# Patient Record
(demographics unavailable — no encounter records)

---

## 2019-02-05 NOTE — NUR
PATIENT C/O CHEST PAIN 8/10. PATIENT EDUCATED ON DIFFERENT TYPES OF PAIN
MEDICATION INCLUDING NITRO SL. PATIENT REFUSES NITRO SL AND STATES "IT DOESN'T
WORK". MORPHINE IVP CAN BE GIVEN AT THIS TIME. WILL MEDICATE AND CONTINUE TO
MONITOR.

## 2019-02-05 NOTE — NUR
PT BIB [facility] AND [facility] C/O 10/10 NON-RADIATING CHEST PAIN DESCRIBED AS
TIGHTNESS X2 DAYS. PT STS THAT HE HAS HAD CHEST PAIN "FOR A VERY LONG TIME",
BUT THAT IT HAS BEEN WORSE OVER THE PAST 2 DAYS. PT ALSO C/O OF BLEEDING FROM
HIS PENIS X1 DAY. PT STS "IT HAS BEEN IN MY URINE FOR AWHILE NOW, BUT NOW THE
BLOOD IS JUST COMING OUT." PER MEDIC, 1 NITRO SPRAY WAS GIVEN EN ROUTE W/ NO
RELIEF, 325 ASA, 4MG ZOFRAN WAS GIVEN EN ROUTE. BS ENROUTE . PT DENIES
BLURRED VISION, DENIES N/V, DENIES FEVER/CHILLS. PT C/O SOB. LUNG SOUNDS CLEAR
THROUGHOUT, RESPS E/U. PT PLACED ON CARDIAC MONITOR, CONTINUOUS PULSE OX, AND
2L O2 VIA NC. PT AWAKE AND ALERT, NAD NOTED AT THIS TIME

## 2019-02-05 NOTE — NUR
RECEIVED REPORT FROM AINSLEY HUMPHREY PRIOR TO PATIENT COMING TO ICU.
PATIENT CAME ON THE [facility].
VITAL SIGNS UPON ADMISSION  WHERE: 94% O2, 21 RESPIRATIONS, 68
HEART RATE, 107/57 BP MAP: 75. PATIENT IS ALERT AND ORIENTED. PATIENT IS
COOPERATIVE, PATIENT FOLLOWS COMMANDS. NEOSENEPRINE DRIP RUNNING AT
50MCG/HR. BED IS AT THE LOWEST POSITION, PATIENT IS CURRENTLY RESTING.

## 2019-02-05 NOTE — NUR
DR. VAZQUEZ AT BEDSIDE TO ASSESS PATIENT. UPDATES PROVIDED AND POC DISCUSSED.
PATIENT MAY EAT PER DR. VAZQUEZ. WILL CONTINUE TO MONITOR.

## 2019-02-05 NOTE — NUR
DR LEWIS ASSESSED PATIENT AT BEDSIDE. SPOKE WITH THE PATIENT ABOUT LOW BLOOD
PRESSURE AND EJECTION FRACTION. PATIENT UNDERSTOOD. NO FURTHER ORDERS AT THIS
TIME.

## 2019-02-05 NOTE — NUR
DOPAMINE WAS STOPPED, PT SWITCHED TO PHENYLEPHRINE PER MD ORDERS DUE TO PT'S
PRESSURE REMAINING LESS THAN SBP 90, AND FREQUENT PVCS SINCE DOPAMINE STARTED

## 2019-02-05 NOTE — NUR
REC'D REPORT FROM FLACO SCHMITZ TO ASSUME CARE. PT IS A/O X4, SPEECH CLEAR AND
APPROPRIATE. PERRLA NOTED. C/O 8/10 CHEST PAIN, MEDICATED WITH MORPHINE IVP,
WILL ON MONITOR FOR EFFECTIVENESS. CARDIAC MONITOR IN PLACE SHOWING NSR. VITAL
SIGNS WNL. ABD SOFT, NONTENDER TO TOUCH. BOWEL SOUNDS ACTIVE. VOIDS FREELY.
EMPTIED 500ML GISSELL COLORED URINE. IV TO LFA INTACT AND PATENT, FLUSHED WITH
10CC NS WITHOUT ANY INFILTRATIION NOTED. PT AMBULATORY. INSTRUCTED TO CALL FOR
ANY ASSISTANCE. CALL LIGHT WITHIN REACH. WILL CONTINUE TO MONITOR.

## 2019-02-05 NOTE — NUR
NIBP 112/68, MAP 81. PANCHITO-SYNEPHRINE DRIP TITRATED DOWN TO 25 MCG/MIN. WILL
CONTINUE TO MONITOR.

## 2019-02-06 NOTE — NUR
PATIENT RESTING COMFORTABLY IN BED AT THIS TIME. NO APPARENT DISTRESS OR
DISCOMFORT NOTED. IV PATENT AND INTACT. ALL QUESTIONS AND CONCERNS ADDRESSED.
SAFETY PRECAUTIONS MAINTAINED. ALL NEEDS ATTENDED TO. WILL ENDORSE ALL CARE TO
NIGHT SHIFT NURSE

## 2019-02-06 NOTE — NUR
RECEIVED PATIENT FROM ICU NURSE AT THIS TIME. PATIENT ABLE TO AMBULATE FROM
WHEELCHAIR TO BED. PATIENT RESTING COMFORTABLY IN BED. NO APPARENT DISTRESS OR
DISCOMFORT NOTED. PATIENT IS A/O X4 AND ABLE TO MAKE NEEDS KNOWN. DAUGHTER AT
BEDSIDE. BREATHING EVEN AND UNLABORED. NO RESPIRATORY DISTRESS NOTED.
PATIENT DENIES CHEST PAIN AT THIS TIME. PACEMAKER IN PLACE.  PULSES PALPABLE
AND NO EDEMA NOTED. BOWEL SOUNDS ACTIVE X4. ABD SOFT AND NONTENDER. PATIENT
DENIES ABD PAIN AT THIS TIME. VOIDS FREELY WITH NO C/O PAIN WHILE URINATING.
IV TO LEFT HAND PATENT AND INTACT. NO REDNESS OR SWELLING NOTED TO THE SITE.
VITAL SIGNS STABLE. PATIENT IS CALM AND COOPERATIVE WITH CARE. ALL QUESTIONS
AND CONCERNS ADDRESSED. ALL NEEDS ATTENDED TO. WILL CONTINUE TO MONITOR

## 2019-02-06 NOTE — NUR
MONITOR TECH REED REPORT PVC'S. PT RESTING IN BED, DENIES ANY HEART
PALPITATIONS, CHEST PAIN AND ANY OTHER DISCOMFORT. BED AT LOW AND CALL LIGHT
WITHIN REACH.

## 2019-02-06 NOTE — NUR
IV TO LEFT HAND PULLED OUT WITH CATH INTACT. NEW IV PLACED TO LEFT FOREARM
20G. PATIENT TOLERATED WELL. ALL QUESTIONS AND CONCERNS ADDRESSED. ALL NEEDS
ATTENDED TO

## 2019-02-06 NOTE — NUR
PT HAS NC BUT PREFERS IT TO BE OFF AT THIS TIME. O2 SAT 96%. VS STABLE.  NO
S/SX OF SOB OR RESPIRATORY DISTRESS NOTED. WILL CONTINUE TO MONITOR.

## 2019-02-06 NOTE — NUR
*Recommend continuing Cardiac Low CHOL Low Fat 2gm Na diet per MD orders.
 
Please see Nutritional Assessment for details.

## 2019-02-06 NOTE — NUR
PT SLEEPING BUT EASILY AROUSABLE. DENIES ANY CP, SYNCOPE, OR DIZZINESS. NO SOB
NOTED, CONT ON O2 2LPM VIA NC.

## 2019-02-06 NOTE — NUR
GIVEN REPORT TO JEANNA SCHMITZ FOR PT TRANSFER. ALL QUESTIONS AND CONCERNS
ANSWERED. PT CURRENTLY AAO X 4. HAS NO S/SX OF SOB OR RESPIRATORY DISTRESS. NO
PAIN OR DISCOMFORT NOTED.

## 2019-02-06 NOTE — NUR
Initial Nutrition Assessment-
 
 Dx: CP
 PMHx: ICD, HTN, schizophrenia, CHF, HLD, Nonischemic cardiomyopathy
 PSHx: none
 Labs: ALB 3.3 L,  H
 Meds: lasix, zofran
Diet: Cardiac Low CHOL Low fat 2gm Na diet x 1 day
PO Intake: (2/5) 50% x 1 meal, NPO for L/D; (2/6)
 Ht: 5'11   Wt: 180 lb, 82 kg    BMI: 25.2 kg/m2   (overweight)
IBW: 172 lb, 78 kg %IBW: 105  UBW: 180-185 lb
 Age: 63 yrs old
 Food Allergies: none
 Skin: is intact. Ramos: 17
Edema: none
GI: abd is soft and non-tender w/ active bowel sounds. Last BM 2/6/19, small
amount, loose
Trigger received for diarrhea >3 days, unintentional wt loss >10 lb in 1
month.
Per H&P, Patient is a 62-year-old gentleman with past medical history of
stroke, known systolic congestive heart failure with ejection fraction of
20-25%, history of ICD placement for primary prevention, and history of
schizophrenia who presents with complaints of ongoing baseline shortness of
breath which patient states has been ongoing for the past few years. He states
difficulty with taking in a deep breath. In the past, patient was previously
on the cardiac transplant list however due to his improved overall cardiac
functional capacity he had been removed from the transplant list by his other
cardiologist at [facility]. He also complains of having blood coming out of
his penis.
Pt c/o poor appetite and has been eating less since admission. This he stated
was d/t dislike to hospital food. Food preferences were verbalized by pt and
RD will note in [employer]. RD asked pt to verify trigger, but pt denied
both triggers and stated that he does not have diarrhea and unintentional wt
loss. RN at bedside during visit.
 
 Problem with:  N: no    V: no    D: no   C: yes
 Problems with: Chewing: no  Swallowing:  no
 Current appetite: fair
Recent wt change: none %wt change: n/a
Vitamin/Supplement use: none
Special diet at home: regular diet
Physical activity: none
Education: was declined.
 
 Estimated Nutritional Needs Based on actual body weight 82 kg
 Energy: 5160-2078 kcal/d (25-30 kcal/kg-maintenance)
 Protein: 66-82 g/d (.8-1 g/kg-maintenance and preservation of lean body mass)
 Fluid: 8584-6893 ml/d (1 ml/kcal-fluid balance) or per doctor
 
Nutrition Diagnosis
 *no nutritional problem
Intervention
*Recommend continuing Cardiac Low CHOL Low Fat 2gm Na diet per MD orders.
 
Monitor/Evaluate
 Goal: PO intake at least 75% of estimated needs
 Monitor: PO intake, Labs, GI function
 F/U in 7 days as low risk (2/13)

## 2019-02-06 NOTE — NUR
Food Preferences noted in [employer] per pt and RN's request. 2/6/19 1322.
No red meat, no chicken, turkey.

## 2019-02-06 NOTE — NUR
RECEIVED PT IN BED AWAKE AND RESTING QUIETLY. HE IS ALERT,ORIENTED X4. NO C/O
HEADACHE AND DIZZINESS. LUNGS CTA. NO SOB ON RA. HE DENIES CHEST PAIN AT THIS
TIME. NO C/O ABDL DISCOMFORT. W/ HL TO LTAC INTACT. CALL LIGHT W/IN REACH. this 77 y.o. Man with CAD s/p CABG, HLD, HTN, Anemia, ESRD on HD, hx of lung cancer s/p radiation, who was recently admitted from 12/22/19 to 1/11/19, treated for Enterobacter PNA.  here for cough and found with RUL consolidation    - suspect PNA/ HCAP  - had been on Zosyn; but no WBC elevation , no fevers  RVP negative  sputum cx  with MRSA    - continue Doxycycline for MRSA     if patient remains stable, consider discharge to community to complete a 7 day course.

## 2019-02-07 NOTE — NUR
CAME AND SAW THE PT. SPOKE WITH PT. INFORMED  ABOUT PT'S
DAUGHTER WANTED TO TALK TO THE PT AND INFORMED HIM CAN PROVIDE PHONE NUMBER.
HE SAID HE TALK TO THE PT ALREADY AND PT CAN GIVE INFORMATION TO HER DAUGHTER.

## 2019-02-07 NOTE — NUR
DISCHARGE INSTRUCTIONS GIVEN. PB SIGNED AND SENT WITH PT. PT PULLED IV OUT
BEFORE NURSE REMOVE IV. CATH IS INTACT. CLEANED THE SITE AND DRESSING APPLIED.
PT'S DAUGHTER IS IN THE LOBBY. CNA WHEELED PT DOWN TO LOBBY. PT DENIES ANY
PAIN. STABLE. DC HOME.

## 2019-02-07 NOTE — NUR
RECEIVED PT IN BED. ASSESSED AND DOCUMENTED. DENIES PAIN THIS TIME.
COMFORTABLY SLEEPING. SAFTEY PRECAUTIONS ARE IN PLACE. WILL MONITOR.

## 2019-02-07 NOTE — NUR
PT SLEPT IN LONG INTERVALS. HE WAS MEDICATED FOR C/O CHEST PAIN X1. PT STATED
HE IS BREATHING BETTER. NO EPISODE OF SOB THIS SHIFT. HE AMBULATES TO THE
RESTROOM W/ STEADY GAIT. ALL NEEDS ATTENDED TO.